# Patient Record
Sex: MALE | URBAN - METROPOLITAN AREA
[De-identification: names, ages, dates, MRNs, and addresses within clinical notes are randomized per-mention and may not be internally consistent; named-entity substitution may affect disease eponyms.]

---

## 2018-02-27 ENCOUNTER — HISTORICAL (OUTPATIENT)
Dept: ADMINISTRATIVE | Facility: HOSPITAL | Age: 33
End: 2018-02-27

## 2018-03-01 LAB — FINAL CULTURE: NORMAL

## 2019-02-11 ENCOUNTER — HISTORICAL (OUTPATIENT)
Dept: ADMINISTRATIVE | Facility: HOSPITAL | Age: 34
End: 2019-02-11

## 2022-04-10 ENCOUNTER — HISTORICAL (OUTPATIENT)
Dept: ADMINISTRATIVE | Facility: HOSPITAL | Age: 37
End: 2022-04-10

## 2022-04-26 VITALS
DIASTOLIC BLOOD PRESSURE: 85 MMHG | HEIGHT: 67 IN | BODY MASS INDEX: 36.2 KG/M2 | WEIGHT: 230.63 LBS | OXYGEN SATURATION: 98 % | SYSTOLIC BLOOD PRESSURE: 116 MMHG

## 2022-04-30 NOTE — PROGRESS NOTES
Patient:   Deo Sarah            MRN: [id number]            FIN: [id number]               Age:   33 years     Sex:  Male     :  1985   Associated Diagnoses:   Effusion of right knee; Pain in right knee   Author:   Koffi Cesar MD      Chief Complaint   2019 17:14 CST      right knee pain (rated 7/10) x 3        History of Present Illness   33-year-old male right knee pain 3 days.  Rated at 7/10.  States that he has had swelling, heat, and pain in his knee before and worked up for gout and septic arthritis.  States that it was not gout or septic arthritis.  Denies falls, injuries, or other inciting incidents.  Has tried over-the-counter medications with no improvement.      Review of Systems   Constitutional:  Negative except as documented in history of present illness.    Ear/Nose/Mouth/Throat:  Negative except as documented in history of present illness.    Respiratory:  Negative except as documented in history of present illness.    Cardiovascular:  Negative except as documented in history of present illness.    Musculoskeletal:  Negative except as documented in history of present illness.    Neurologic:  Negative except as documented in history of present illness.       Health Status   Allergies:    Allergic Reactions (Selected)  No Known Allergies,    Allergies (1) Active Reaction  No Known Allergies None Documented   Current medications:  (Selected)   Outpatient Medications  Ordered  Lidocaine 1% PF  5ml inj: 4 mL, form: Injection, Intra-Articular, Once, first dose 19 17:36:00 CST, stop date 19 17:36:00 CST  Prescriptions  Prescribed  Robaxin 750 mg oral tablet: 1,500 mg = 2 tab(s), Oral, TID, # 60 tab(s), 1 Refill(s), Pharmacy: [facility] [id number]  Zantac 150 oral tablet: 150 mg = 1 tab(s), Oral, BID, # 28 tab(s), 0 Refill(s)  Zofran 4 mg oral tablet: 4 mg = 1 tab(s), Oral, q8hr, PRN PRN nausea/vomiting, # 15 tab(s), 0 Refill(s)  methocarbamol 750 mg oral  tablet: 1,500 mg = 2 tab(s), Oral, TID, X 10 day(s), # 60 tab(s), 0 Refill(s)  traMADol 50 mg oral tablet: 50 mg = 1 tab(s), Oral, q6hr, # 12 tab(s), 0 Refill(s),    Home Medications (5) Active  methocarbamol 750 mg oral tablet 1,500 mg = 2 tab(s), Oral, TID  Robaxin 750 mg oral tablet 1,500 mg = 2 tab(s), Oral, TID  traMADol 50 mg oral tablet 50 mg = 1 tab(s), Oral, q6hr  Zantac 150 oral tablet 150 mg = 1 tab(s), Oral, BID  Zofran 4 mg oral tablet 4 mg = 1 tab(s), PRN, Oral, q8hr   Problem list:    All Problems  H/O: rheumatoid arthritis / SNOMED CT 412570857 / Confirmed  Obesity / SNOMED CT 2798882443 / Probable      Histories   Past Medical History:    Active  H/O: rheumatoid arthritis ([id number])   Family History:    Cancer  Grandfather  Grandmother  Mother  Diabetes mellitus  Father     Procedure history:    Cholecystectomy; (62690).   Social History        Social & Psychosocial Habits    Alcohol  02/27/2018  Use: Current    Substance Abuse  02/11/2019  Use: Never    Tobacco  02/27/2018  Use: Never smoker    02/11/2019  Use: 4 or less cigarettes(less    Type: Electronic Device    Patient Wants Consult For Cessation Counseling N/A.        Physical Examination   Vital Signs   2/11/2019 17:14 CST      Temperature Oral          36.9 DegC                             Temperature Oral (calculated)             98.42 DegF                             Peripheral Pulse Rate     92 bpm                             Respiratory Rate          18 br/min                             SpO2                      98 %                             Systolic Blood Pressure   116 mmHg                             Diastolic Blood Pressure  85 mmHg                             Blood Pressure Location   Left arm                             Manual Cuff BP            No     Measurements from flowsheet : Measurements   2/11/2019 17:14 CST      Weight Dosing             104.6 kg                             Weight Measured           104.6 kg                              Weight Measured and Calculated in Lbs     230.60 lb                             Height/Length Dosing      170 cm                             Height/Length Measured    170 cm                             BSA Measured              2.22 m2                             Body Mass Index Measured  36.19 kg/m2     General:  Alert and oriented, No acute distress.    Eye:  Extraocular movements are intact, Normal conjunctiva.    HENT:  Normocephalic, Oral mucosa is moist.    Musculoskeletal:  Right knee-mild effusion.  Pain on active and passive motion.  No joint laxity.  Anterior and posterior drawer test within normal limits..    Integumentary:  Warm, Dry.    Neurologic:  Alert, Oriented, No focal deficits.    Psychiatric:  Cooperative, Appropriate mood & affect.    Procedure note: Joint aspiration  Diagnosis: Right knee pain  Indications: Diagnosis  Details: Prepped with ChloraPrep.  Anesthesia with lidocaine 1% without.  Tender to aspirate fluid through anterior lateral approach.  Less than 1 mL of fluid aspirated.  It was clear yellowish.  Sample too small to send the lab.  Routine post procedure instructions      Review / Management   Knee x-ray showed no fractures      Impression and Plan   Diagnosis     Effusion of right knee ([id number] M25.461).     Pain in right knee ([id number] M25.561).     Tramadol and Robaxin as prescribed  Referral to sports medicine  ER precautions  Follow-up with PCP